# Patient Record
Sex: FEMALE | Race: OTHER | Employment: OTHER | URBAN - METROPOLITAN AREA
[De-identification: names, ages, dates, MRNs, and addresses within clinical notes are randomized per-mention and may not be internally consistent; named-entity substitution may affect disease eponyms.]

---

## 2022-06-02 ENCOUNTER — OFFICE VISIT (OUTPATIENT)
Dept: PRIMARY CARE CLINIC | Age: 27
End: 2022-06-02
Payer: COMMERCIAL

## 2022-06-02 VITALS
HEIGHT: 62 IN | OXYGEN SATURATION: 99 % | DIASTOLIC BLOOD PRESSURE: 64 MMHG | SYSTOLIC BLOOD PRESSURE: 100 MMHG | BODY MASS INDEX: 21.71 KG/M2 | WEIGHT: 118 LBS | HEART RATE: 89 BPM

## 2022-06-02 DIAGNOSIS — Z00.00 ANNUAL PHYSICAL EXAM: ICD-10-CM

## 2022-06-02 DIAGNOSIS — Z86.39 HX OF GRAVES' DISEASE: ICD-10-CM

## 2022-06-02 DIAGNOSIS — E03.2 HYPOTHYROIDISM DUE TO MEDICATION: ICD-10-CM

## 2022-06-02 DIAGNOSIS — Z00.00 ANNUAL PHYSICAL EXAM: Primary | ICD-10-CM

## 2022-06-02 DIAGNOSIS — L80 VITILIGO: ICD-10-CM

## 2022-06-02 LAB
A/G RATIO: 1.7 (ref 1.1–2.2)
ALBUMIN SERPL-MCNC: 4.7 G/DL (ref 3.4–5)
ALP BLD-CCNC: 61 U/L (ref 40–129)
ALT SERPL-CCNC: 11 U/L (ref 10–40)
ANION GAP SERPL CALCULATED.3IONS-SCNC: 14 MMOL/L (ref 3–16)
AST SERPL-CCNC: 16 U/L (ref 15–37)
BASOPHILS ABSOLUTE: 0 K/UL (ref 0–0.2)
BASOPHILS RELATIVE PERCENT: 0.4 %
BILIRUB SERPL-MCNC: 0.3 MG/DL (ref 0–1)
BUN BLDV-MCNC: 8 MG/DL (ref 7–20)
CALCIUM SERPL-MCNC: 9.8 MG/DL (ref 8.3–10.6)
CHLORIDE BLD-SCNC: 106 MMOL/L (ref 99–110)
CO2: 22 MMOL/L (ref 21–32)
CREAT SERPL-MCNC: 0.6 MG/DL (ref 0.6–1.1)
EOSINOPHILS ABSOLUTE: 0 K/UL (ref 0–0.6)
EOSINOPHILS RELATIVE PERCENT: 0.1 %
GFR AFRICAN AMERICAN: >60
GFR NON-AFRICAN AMERICAN: >60
GLUCOSE BLD-MCNC: 85 MG/DL (ref 70–99)
HCT VFR BLD CALC: 37.7 % (ref 36–48)
HEMOGLOBIN: 12.5 G/DL (ref 12–16)
HEPATITIS C ANTIBODY INTERPRETATION: NORMAL
LYMPHOCYTES ABSOLUTE: 1.4 K/UL (ref 1–5.1)
LYMPHOCYTES RELATIVE PERCENT: 26.6 %
MCH RBC QN AUTO: 32 PG (ref 26–34)
MCHC RBC AUTO-ENTMCNC: 33.2 G/DL (ref 31–36)
MCV RBC AUTO: 96.2 FL (ref 80–100)
MONOCYTES ABSOLUTE: 0.3 K/UL (ref 0–1.3)
MONOCYTES RELATIVE PERCENT: 5.2 %
NEUTROPHILS ABSOLUTE: 3.7 K/UL (ref 1.7–7.7)
NEUTROPHILS RELATIVE PERCENT: 67.7 %
PDW BLD-RTO: 14 % (ref 12.4–15.4)
PLATELET # BLD: 220 K/UL (ref 135–450)
PMV BLD AUTO: 10.8 FL (ref 5–10.5)
POTASSIUM SERPL-SCNC: 4.2 MMOL/L (ref 3.5–5.1)
RBC # BLD: 3.91 M/UL (ref 4–5.2)
SODIUM BLD-SCNC: 142 MMOL/L (ref 136–145)
T4 FREE: 1.1 NG/DL (ref 0.9–1.8)
TOTAL PROTEIN: 7.4 G/DL (ref 6.4–8.2)
TSH SERPL DL<=0.05 MIU/L-ACNC: 1.36 UIU/ML (ref 0.27–4.2)
WBC # BLD: 5.4 K/UL (ref 4–11)

## 2022-06-02 PROCEDURE — 99385 PREV VISIT NEW AGE 18-39: CPT | Performed by: STUDENT IN AN ORGANIZED HEALTH CARE EDUCATION/TRAINING PROGRAM

## 2022-06-02 PROCEDURE — 81002 URINALYSIS NONAUTO W/O SCOPE: CPT | Performed by: STUDENT IN AN ORGANIZED HEALTH CARE EDUCATION/TRAINING PROGRAM

## 2022-06-02 RX ORDER — METHIMAZOLE 10 MG/1
10 TABLET ORAL 3 TIMES DAILY
COMMUNITY

## 2022-06-02 SDOH — ECONOMIC STABILITY: FOOD INSECURITY: WITHIN THE PAST 12 MONTHS, THE FOOD YOU BOUGHT JUST DIDN'T LAST AND YOU DIDN'T HAVE MONEY TO GET MORE.: NEVER TRUE

## 2022-06-02 SDOH — ECONOMIC STABILITY: FOOD INSECURITY: WITHIN THE PAST 12 MONTHS, YOU WORRIED THAT YOUR FOOD WOULD RUN OUT BEFORE YOU GOT MONEY TO BUY MORE.: NEVER TRUE

## 2022-06-02 ASSESSMENT — PATIENT HEALTH QUESTIONNAIRE - PHQ9
SUM OF ALL RESPONSES TO PHQ QUESTIONS 1-9: 0
2. FEELING DOWN, DEPRESSED OR HOPELESS: 0
SUM OF ALL RESPONSES TO PHQ9 QUESTIONS 1 & 2: 0
SUM OF ALL RESPONSES TO PHQ QUESTIONS 1-9: 0
1. LITTLE INTEREST OR PLEASURE IN DOING THINGS: 0

## 2022-06-02 ASSESSMENT — SOCIAL DETERMINANTS OF HEALTH (SDOH): HOW HARD IS IT FOR YOU TO PAY FOR THE VERY BASICS LIKE FOOD, HOUSING, MEDICAL CARE, AND HEATING?: NOT HARD AT ALL

## 2022-06-02 NOTE — PROGRESS NOTES
2022    Ashley Magaña  (:  1995) is a 32 y.o. female, here for a preventive medicine evaluation. Patient Active Problem List   Diagnosis    Vitiligo    Hx of Graves' disease    Hypothyroidism due to medication     Moved from [facility] x 3 years ago and hasnt seen doctor since. Has been off methimazole for 2 months. Has been on medication for 3 years now. Felt really tired, dizzy, while he was on medicine and hair loss x couple months. No problems with constipation or diarrhea    Will get pain LUQ around lateral lower ribs that will wake up with pain, every other day, comes and goes, dull ache and notices after she walks for awhile or works out. Not associated with food. No dysuria or hematuria, no personal history of kidney stones but mother does have this. Review of Systems   All other systems reviewed and are negative. Prior to Visit Medications    Medication Sig Taking? Authorizing Provider   LEVOTHYROXINE SODIUM PO Take 7.5 mcg by mouth Yes Historical Provider, MD   methIMAzole (TAPAZOLE) 10 MG tablet Take 10 mg by mouth 3 times daily Yes Historical Provider, MD        No Known Allergies    Past Medical History:   Diagnosis Date    Hypothyroidism        History reviewed. No pertinent surgical history.     Social History     Socioeconomic History    Marital status:      Spouse name: Not on file    Number of children: Not on file    Years of education: Not on file    Highest education level: Not on file   Occupational History    Not on file   Tobacco Use    Smoking status: Never Smoker    Smokeless tobacco: Never Used   Vaping Use    Vaping Use: Never used   Substance and Sexual Activity    Alcohol use: Not Currently    Drug use: Never    Sexual activity: Not on file   Other Topics Concern    Not on file   Social History Narrative    Not on file     Social Determinants of Health     Financial Resource Strain: Low Risk     Difficulty of Paying Living Expenses: Not hard at all   Food Insecurity: No Food Insecurity    Worried About [facility] in the Last Year: Never true    Alpesh of Food in the Last Year: Never true   Transportation Needs:     Lack of Transportation (Medical): Not on file    Lack of Transportation (Non-Medical): Not on file   Physical Activity:     Days of Exercise per Week: Not on file    Minutes of Exercise per Session: Not on file   Stress:     Feeling of Stress : Not on file   Social Connections:     Frequency of Communication with Friends and Family: Not on file    Frequency of Social Gatherings with Friends and Family: Not on file    Attends Bahai Services: Not on file    Active Member of [address] [place] [address] or Organizations: Not on file    Attends Club or Organization Meetings: Not on file    Marital Status: Not on file   Intimate Partner Violence:     Fear of Current or Ex-Partner: Not on file    Emotionally Abused: Not on file    Physically Abused: Not on file    Sexually Abused: Not on file   Housing Stability:     Unable to Pay for Housing in the Last Year: Not on file    Number of [place] in the Last Year: Not on file    Unstable Housing in the Last Year: Not on file        History reviewed. No pertinent family history. ADVANCE DIRECTIVE: N, <no information>    Vitals:    06/02/22 1032   BP: 100/64   Site: Right Upper Arm   Position: Sitting   Cuff Size: Medium Adult   Pulse: 89   SpO2: 99%   Weight: 118 lb (53.5 kg)   Height: 5' 2\" (1.575 m)     Estimated body mass index is 21.58 kg/m² as calculated from the following:    Height as of this encounter: 5' 2\" (1.575 m). Weight as of this encounter: 118 lb (53.5 kg). Physical Exam  Vitals reviewed. Constitutional:       General: She is not in acute distress. Appearance: Normal appearance. She is not ill-appearing. HENT:      Head: Normocephalic and atraumatic.       Right Ear: Tympanic membrane, ear canal and external ear normal.      Left Ear: Tympanic membrane, ear canal and external ear normal.      Nose: Nose normal. No rhinorrhea. Mouth/Throat:      Mouth: Mucous membranes are moist.      Pharynx: Oropharynx is clear. No oropharyngeal exudate. Eyes:      General: No scleral icterus. Right eye: No discharge. Left eye: No discharge. Extraocular Movements: Extraocular movements intact. Conjunctiva/sclera: Conjunctivae normal.   Cardiovascular:      Rate and Rhythm: Normal rate and regular rhythm. Pulses: Normal pulses. Heart sounds: Normal heart sounds. No murmur heard. No gallop. Pulmonary:      Effort: Pulmonary effort is normal.      Breath sounds: Normal breath sounds. No wheezing, rhonchi or rales. Abdominal:      General: Abdomen is flat. Bowel sounds are normal. There is no distension. Palpations: Abdomen is soft. There is no mass. Musculoskeletal:         General: Normal range of motion. Cervical back: Neck supple. No muscular tenderness. Lymphadenopathy:      Cervical: No cervical adenopathy. Skin:     General: Skin is warm. Capillary Refill: Capillary refill takes less than 2 seconds. Findings: No rash. Neurological:      General: No focal deficit present. Mental Status: She is alert. Cranial Nerves: No cranial nerve deficit. Psychiatric:         Mood and Affect: Mood normal.         Behavior: Behavior normal.         No flowsheet data found. No results found for: CHOL, CHOLFAST, TRIG, TRIGLYCFAST, HDL, LDLCHOLESTEROL, LDLCALC, GLUF, GLUCOSE, LABA1C    The ASCVD Risk score (Odfederico Baxter., et al., 2013) failed to calculate for the following reasons: The 2013 ASCVD risk score is only valid for ages 36 to 78      There is no immunization history on file for this patient.     Health Maintenance   Topic Date Due    Varicella vaccine (1 of 2 - 2-dose childhood series) Never done    COVID-19 Vaccine (1) Never done    Depression Screen  Never done    HIV screen  Never done    Hepatitis C screen  Never done    DTaP/Tdap/Td vaccine (1 - Tdap) Never done    Pap smear  Never done    Flu vaccine (Season Ended) 09/01/2022    Hepatitis A vaccine  Aged Out    Hepatitis B vaccine  Aged Out    Hib vaccine  Aged Out    Meningococcal (ACWY) vaccine  Aged Out    Pneumococcal 0-64 years Vaccine  Aged Out       Assessment & Plan   Annual physical exam  -     CBC with Auto Differential; Future  -     Comprehensive Metabolic Panel; Future  -     HIV Screen; Future  -     Hepatitis C Antibody; Future  -     POCT Urinalysis no Micro  Vitiligo  Hx of Graves' disease  -     T4, Free; Future  -     TSH; Future  -     THYROID STIMULATING IMMUNOGLOBULIN; Future  -     THYROTROPIN RECEPTOR ANTIBODY; Future  Hypothyroidism due to medication  Will await labs before sending in new medication. General wellness exam. Reviewed chart for past hx and updated today. Counseled on age appropriate health guidance and discussed screening recommendations. Vaccinations reviewed and discussed. All questions answered    Return in about 1 year (around 6/2/2023), or if symptoms worsen or fail to improve.          --Izaiah Amezcua MD

## 2022-06-03 LAB
HIV AG/AB: NORMAL
HIV ANTIGEN: NORMAL
HIV-1 ANTIBODY: NORMAL
HIV-2 AB: NORMAL

## 2022-06-04 LAB — TSH RECEPTOR AB: <0.8 IU/L

## 2022-06-07 LAB — THYROID STIMULATING IMMUNOGLOBULIN: <0.1 IU/L

## 2022-06-13 DIAGNOSIS — E05.00 GRAVES DISEASE: Primary | ICD-10-CM

## 2022-09-15 ENCOUNTER — OFFICE VISIT (OUTPATIENT)
Dept: ENDOCRINOLOGY | Age: 27
End: 2022-09-15
Payer: COMMERCIAL

## 2022-09-15 VITALS
HEIGHT: 62 IN | RESPIRATION RATE: 14 BRPM | HEART RATE: 75 BPM | TEMPERATURE: 98 F | WEIGHT: 119 LBS | BODY MASS INDEX: 21.9 KG/M2 | SYSTOLIC BLOOD PRESSURE: 110 MMHG | OXYGEN SATURATION: 99 % | DIASTOLIC BLOOD PRESSURE: 68 MMHG

## 2022-09-15 DIAGNOSIS — H57.89 THYROID EYE DISEASE: ICD-10-CM

## 2022-09-15 DIAGNOSIS — E05.00 GRAVES' DISEASE: ICD-10-CM

## 2022-09-15 DIAGNOSIS — E07.9 THYROID EYE DISEASE: ICD-10-CM

## 2022-09-15 DIAGNOSIS — E04.9 THYROID ENLARGEMENT: ICD-10-CM

## 2022-09-15 DIAGNOSIS — L80 VITILIGO: ICD-10-CM

## 2022-09-15 DIAGNOSIS — E05.00 GRAVES' DISEASE: Primary | ICD-10-CM

## 2022-09-15 LAB
ANTI-THYROGLOB ABS: 18 IU/ML
T3 FREE: 2.9 PG/ML (ref 2.3–4.2)
T3 TOTAL: 1.08 NG/ML (ref 0.8–2)
T4 TOTAL: 7.4 UG/DL (ref 4.5–10.9)
THYROID PEROXIDASE (TPO) ABS: 149 IU/ML

## 2022-09-15 PROCEDURE — 99204 OFFICE O/P NEW MOD 45 MIN: CPT | Performed by: INTERNAL MEDICINE

## 2022-09-15 RX ORDER — M-VIT,TX,IRON,MINS/CALC/FOLIC 27MG-0.4MG
1 TABLET ORAL DAILY
COMMUNITY

## 2022-09-15 NOTE — PROGRESS NOTES
SUBJECTIVE:  Tuan David  is a 32 y.o. female who is being evaluated for Graves' disease. 1. Graves' disease  This started in 2017. Patient was diagnosed with Graves' disese. The problem has been unchanged. Previous thyroid studies include: TSH, triiodothyronine free, and free thyroxine. Patient started medication in N/A. Currently patient is on: N/A. Misses N/A doses a month. Current complaints: denies fatigue, weight changes, heat/cold intolerance, bowel/skin changes or CVS symptoms  At the time of diagnosis patient had sweating, tachycardia, hand tremor, lost weight, insomnia, feeling hot. [place] a [place], and in the beginning she had only emergency insurance. .  All meds from [facility]  Now has insurance  Patient was treated with Tapazole 10 mg 3 tablets daily. Had palpitations, fast heart beat 5 years ago. Patient was found to have larger left eye at that time. In the beginning she was treated with Tapazole 10 mg daily. 3 years ago increased to 3 tablets daily. At the same time she was started on levothyroxine 0.075 mg daily for 3 years  Felt dizzy, out of balance. Stopped taking both meds 4 months ago. Dr. Yovani Ga referred here. Patient feels okay overall    2. Thyroid eye disease  Has eye sensitivity  Did not get worse. In [facility] had Ophthalmolgy testing. Left eye more prominent. 3.  Thyroid enlargement  History of obstructive symptoms: difficulty swallowing No, changes in voice/hoarseness No.  History of radiation to patient's neck: No  Resent iodine exposure: No  Family history includes no thyroid abnormalities. Family history of thyroid cancer: No  Vitiligo since 1yo  On all body    4. Vitiligo  All over the body.   She has it since age 1      Past Medical History:   Diagnosis Date    Hypothyroidism      Patient Active Problem List    Diagnosis Date Noted    Thyroid eye disease 09/16/2022    Graves' disease 09/15/2022    Thyroid enlargement 09/15/2022    Vitiligo 06/02/2022    Hx of Graves' disease 06/02/2022    Hypothyroidism due to medication 06/02/2022     History reviewed. No pertinent surgical history. Family History   Problem Relation Age of Onset    Kidney Disease Mother     No Known Problems Father     No Known Problems Sister     No Known Problems Sister      Social History     Socioeconomic History    Marital status:      Spouse name: None    Number of children: None    Years of education: None    Highest education level: None   Tobacco Use    Smoking status: Never    Smokeless tobacco: Never   Vaping Use    Vaping Use: Never used   Substance and Sexual Activity    Alcohol use: Not Currently    Drug use: Never     Social Determinants of Health     Financial Resource Strain: Low Risk     Difficulty of Paying Living Expenses: Not hard at all   Food Insecurity: No Food Insecurity    Worried About Running Out of Food in the Last Year: Never true    Ran Out of Food in the Last Year: Never true     Current Outpatient Medications   Medication Sig Dispense Refill    UNABLE TO FIND Take 1 capsule by mouth daily Nutrilite- PMS Women's Health (ease premenstrual sx) - contains 60 mcg of Vitamin A, contains 125 mg of evening primrose oil, 125 mg of borage oil, 50mg of tita extract, 20 mg of dong quai extract      Multiple Vitamins-Minerals (THERAPEUTIC MULTIVITAMIN-MINERALS) tablet Take 1 tablet by mouth daily      LEVOTHYROXINE SODIUM PO Take 7.5 mcg by mouth (Patient not taking: Reported on 9/15/2022)      methIMAzole (TAPAZOLE) 10 MG tablet Take 10 mg by mouth 3 times daily (Patient not taking: Reported on 9/15/2022)       No current facility-administered medications for this visit.      No Known Allergies  Family Status   Relation Name Status    Mother  Alive    Father  Alive    Sister  Alive    Sister  Alive       Review of Systems:  Constitutional: no fatigue, no fever, no recent weight gain, no recent weight loss, no changes in appetite  Eyes: no eye pain, no change in vision, no eye redness, no eye irritation, no double vision  Ears, nose, throat: no nasal congestion, no sore throat, no earache, no decrease in hearing, no hoarseness, no dry mouth, no sinus problems, no difficulty swallowing, no neck lumps, no dental problems, no mouth sores, no ringing in ears  Pulmonary: no shortness of breath, no wheezing, no dyspnea on exertion, no cough  Cardiovascular: no chest pain, no lower extremity edema, no orthopnea, no intermittent leg claudication, no palpitations  Gastrointestinal: no abdominal pain, no nausea, no vomiting, no diarrhea, no constipation, no dysphagia, no heartburn, no bloating  Genitourinary: no dysuria, no urinary incontinence, no urinary hesitancy, no urinary frequency, no feelings of urinary urgency, no nocturia  Musculoskeletal: no joint swelling, no joint stiffness, no joint pain, no muscle cramps, no muscle pain, no bone pain  Integument/Breast: no hair loss, no skin rashes, no skin lesions, no itching, no dry skin, has skin changes due to vitiligo  Neurological: no numbness, no tingling, no weakness, no confusion, has headaches, no dizziness, no fainting, no tremors, no decrease in memory, no balance problems  Psychiatric: no anxiety, no depression, no insomnia  Hematologic/Lymphatic: no tendency for easy bleeding, no swollen lymph nodes, no tendency for easy bruising  Immunology: has seasonal allergies, no frequent infections, no frequent illnesses  Endocrine: no temperature intolerance    /68   Pulse 75   Temp 98 °F (36.7 °C)   Resp 14   Ht 5' 2\" (1.575 m)   Wt 119 lb (54 kg)   SpO2 99%   BMI 21.77 kg/m²    Wt Readings from Last 3 Encounters:   09/15/22 119 lb (54 kg)   06/02/22 118 lb (53.5 kg)     Body mass index is 21.77 kg/m².     OBJECTIVE:  Constitutional: no acute distress, well appearing and well nourished  Psychiatric: oriented to person, place and time, judgement and insight and normal, recent and remote memory and intact and mood and affect are normal  Skin: skin and subcutaneous tissue is normal without mass, normal turgor, has vitiligo rash on her body  Head and Face: examination of head and face revealed no abnormalities  Eyes: no lid or conjunctival swelling, erythema or discharge, pupils are normal, equal, round, reactive to light, no lid lag, stare or proptosis  Ears/Nose: external inspection of ears and nose revealed no abnormalities, hearing is grossly normal  Oropharynx/Mouth/Face: lips, tongue and gums are normal with no lesions, the voice quality was normal  Neck: neck is supple and symmetric, with midline trachea and no masses, thyroid is normal  Lymphatics: normal cervical lymph nodes, normal supraclavicular nodes  Pulmonary: no increased work of breathing or signs of respiratory distress, lungs are clear to auscultation  Cardiovascular: normal heart rate and rhythm, normal S1 and S2, no murmurs and pedal pulses and 2+ bilaterally, No edema  Abdomen: abdomen is soft, non-tender with no masses  Musculoskeletal: normal gait and station and exam of the digits and nails are normal  Neurological: normal coordination and normal general cortical function, no hand tremor      Lab Review:    Lab Results   Component Value Date/Time    WBC 5.4 06/02/2022 11:37 AM    HGB 12.5 06/02/2022 11:37 AM    HCT 37.7 06/02/2022 11:37 AM    MCV 96.2 06/02/2022 11:37 AM     06/02/2022 11:37 AM     Lab Results   Component Value Date/Time     06/02/2022 11:37 AM    K 4.2 06/02/2022 11:37 AM     06/02/2022 11:37 AM    CO2 22 06/02/2022 11:37 AM    BUN 8 06/02/2022 11:37 AM    CREATININE 0.6 06/02/2022 11:37 AM    GLUCOSE 85 06/02/2022 11:37 AM    CALCIUM 9.8 06/02/2022 11:37 AM    PROT 7.4 06/02/2022 11:37 AM    LABALBU 4.7 06/02/2022 11:37 AM    BILITOT 0.3 06/02/2022 11:37 AM    ALKPHOS 61 06/02/2022 11:37 AM    AST 16 06/02/2022 11:37 AM    ALT 11 06/02/2022 11:37 AM    LABGLOM >60 06/02/2022 11:37 AM    GFRAA >60 06/02/2022 11:37 AM    AGRATIO 1.7 06/02/2022 11:37 AM     Lab Results   Component Value Date/Time    TSH 1.36 06/02/2022 11:37 AM    FT3 2.9 09/15/2022 10:32 AM     No results found for: LABA1C  No results found for: EAG  No results found for: CHOL  No results found for: TRIG  No results found for: HDL  No results found for: LDLCHOLESTEROL, LDLCALC  No results found for: LABVLDL, VLDL  No results found for: CHOLHDLRATIO  No results found for: LABMICR, [id number]  No results found for: VITD25     ASSESSMENT/PLAN:  1. Graves' disease  Obtain lab work, then reevaluate up-to-date done work-up for hyperthyroidism showed normal thyroid function. TSI and TR antibodies negative  Call for results  If stable, follow-up in 1 year. If symptomatic, follow-up anytime as needed  Counseled patient about Graves' disease diagnosis, pathophysiology, follow-up, monitoring, relapse, importance of testing  Counseled patient that combination levothyroxine and methimazole regimen is not used at this time as it was used in the past.  Counseled patient about Tepezza treatment. - US HEAD NECK SOFT TISSUE THYROID; Future  - T3; Future  - T4; Future  - T3, Free; Future  -Free T4  - TSH  - Thyroid Peroxidase Antibody; Future  - Anti-Thyroglobulin Antibody; Future  -TSI  - TR antibody    2. Thyroid enlargement  Obtain thyroid sonogram  - US HEAD NECK SOFT TISSUE THYROID; Future  - T3; Future  - T4; Future  - T3, Free; Future  - Thyroid Peroxidase Antibody; Future  - Anti-Thyroglobulin Antibody; Future    3. Vitiligo  Discussed vitiligo association with autoimmune thyroid disease. Continue monitoring    4. Thyroid eye disease  On exam left eye larger, but no proptosis, stare or lid lag. Continue close monitoring. If indicated, refer to Dr. Nazario Dawnt patient about Marco Mckenzie. TR antibody and TSI negative at this time.       Reviewed and/or ordered clinical lab results Yes  Reviewed and/or ordered radiology tests Yes   Reviewed and/or ordered other diagnostic tests No  Discussed test results with performing physician No  Independently reviewed image, tracing, or specimen No  Made a decision to obtain old records No  Reviewed and summarized old records Yes  ALT 11  AST 16  White blood cell count 5.4  TSI less than 0.1  TSH 1.36  Free T4 1.1  ER antibody less than 0.8 obtained history from other than patient Joslyn Alicea Alecia Celaya  was counseled regarding symptoms of thyroid disease diagnosis, course and complications of disease if inadequately treated, side effects of medications, diagnosis, treatment options, and prognosis, risks, benefits, complications, and alternatives of treatment, labs, imaging and other studies and treatment targets and goals. She understands instructions and counseling. Total time I spent for this encounter gathering history, performing physical exam, coordinating care, counseling, and documenting in the chart - 45 minutes    Return in about 1 year (around 9/15/2023) for thyroid problems.     Electronically signed by Tariq Marques MD on 9/16/2022 at 12:53 AM

## 2022-09-16 PROBLEM — H57.89 THYROID EYE DISEASE: Status: ACTIVE | Noted: 2022-09-16

## 2022-09-16 PROBLEM — E07.9 THYROID EYE DISEASE: Status: ACTIVE | Noted: 2022-09-16

## 2022-09-17 ENCOUNTER — TELEPHONE (OUTPATIENT)
Dept: ENDOCRINOLOGY | Age: 27
End: 2022-09-17

## 2022-09-22 ENCOUNTER — HOSPITAL ENCOUNTER (OUTPATIENT)
Dept: ULTRASOUND IMAGING | Age: 27
Discharge: HOME | End: 2022-09-22
Payer: COMMERCIAL

## 2022-09-22 DIAGNOSIS — E05.00 GRAVES' DISEASE: ICD-10-CM

## 2022-09-22 DIAGNOSIS — E04.9 THYROID ENLARGEMENT: ICD-10-CM

## 2022-09-22 PROCEDURE — 76536 US EXAM OF HEAD AND NECK: CPT

## 2022-09-25 ENCOUNTER — TELEPHONE (OUTPATIENT)
Dept: ENDOCRINOLOGY | Age: 27
End: 2022-09-25

## 2022-09-25 DIAGNOSIS — E04.1 THYROID NODULE: Primary | ICD-10-CM

## 2022-09-25 NOTE — TELEPHONE ENCOUNTER
Please inform patient:  Thyroid is enlarged, especially on the right side. Right isthmus nodule 1.3 cm with calcifications. This nodule needs to be biopsied. Please ask patient to schedule biopsy at [facility]  I placed order.

## 2022-10-04 ENCOUNTER — HOSPITAL ENCOUNTER (OUTPATIENT)
Dept: ULTRASOUND IMAGING | Age: 27
Discharge: HOME | End: 2022-10-04
Payer: COMMERCIAL

## 2022-10-04 DIAGNOSIS — E04.1 THYROID NODULE: ICD-10-CM

## 2022-10-04 PROCEDURE — 88305 TISSUE EXAM BY PATHOLOGIST: CPT

## 2022-10-04 PROCEDURE — 10005 FNA BX W/US GDN 1ST LES: CPT

## 2022-10-04 PROCEDURE — 88172 CYTP DX EVAL FNA 1ST EA SITE: CPT

## 2022-10-04 PROCEDURE — 88173 CYTOPATH EVAL FNA REPORT: CPT

## 2022-10-11 ENCOUNTER — TELEPHONE (OUTPATIENT)
Dept: ENDOCRINOLOGY | Age: 27
End: 2022-10-11

## 2022-10-13 NOTE — TELEPHONE ENCOUNTER
FINAL DIAGNOSIS:     Thyroid, Isthmus Fine Needle Aspiration:   SUSPICIOUS     Cannot rule out a follicular neoplasm. I spoke with patient. Advised that I will contact pathology to determine sending specimen for Afirma. I will get back with her.
Have You Had Microneedling Treatments Before?: has had a previous microneedling treatment
I called pathology department and ask pathologist to call me back.
I called pathology department and confirmed that patient's biopsy material will be sent for Afirma testing.
I spoke with pathologist.  Cytology is considered indeterminate. Material will be sent to Afirma testing. Notified patient. Please call pathology to confirm that material was sent for Afirma testing. Also, please cancel her appointment on 13th and reschedule next available.
Noted pt aware. Appt cancelled.
Please schedule patient on Thursday. Suspicious biopsy.
Pt scheduled.
When Was Your Last Treatment?: 1/15/19

## 2022-11-15 ENCOUNTER — OFFICE VISIT (OUTPATIENT)
Dept: ENDOCRINOLOGY | Age: 27
End: 2022-11-15
Payer: COMMERCIAL

## 2022-11-15 ENCOUNTER — TELEPHONE (OUTPATIENT)
Dept: ENDOCRINOLOGY | Age: 27
End: 2022-11-15

## 2022-11-15 VITALS
SYSTOLIC BLOOD PRESSURE: 130 MMHG | TEMPERATURE: 98 F | WEIGHT: 123 LBS | DIASTOLIC BLOOD PRESSURE: 70 MMHG | OXYGEN SATURATION: 98 % | HEART RATE: 88 BPM | RESPIRATION RATE: 14 BRPM | HEIGHT: 62 IN | BODY MASS INDEX: 22.63 KG/M2

## 2022-11-15 DIAGNOSIS — E05.00 GRAVES' DISEASE: Primary | ICD-10-CM

## 2022-11-15 DIAGNOSIS — L80 VITILIGO: ICD-10-CM

## 2022-11-15 DIAGNOSIS — E04.9 GOITER: ICD-10-CM

## 2022-11-15 DIAGNOSIS — E07.9 THYROID EYE DISEASE: ICD-10-CM

## 2022-11-15 DIAGNOSIS — E04.1 THYROID NODULE: ICD-10-CM

## 2022-11-15 DIAGNOSIS — H57.89 THYROID EYE DISEASE: ICD-10-CM

## 2022-11-15 PROCEDURE — 99215 OFFICE O/P EST HI 40 MIN: CPT | Performed by: INTERNAL MEDICINE

## 2022-11-15 NOTE — PROGRESS NOTES
SUBJECTIVE:  Rome Rodriguez  is a 32 y.o. female who is being evaluated for thyroid nodule, abnormal biopsy       Thyroid nodule/goiter  History of obstructive symptoms: difficulty swallowing No, changes in voice/hoarseness No.  History of radiation to patient's neck: No  Resent iodine exposure: No  Family history includes no thyroid abnormalities. Family history of thyroid cancer: No  Vitiligo since 1yo  On all body  Right isthmus nodule 1.3 cm FNA abnormal, patient is here to discuss results and further plan. 2. Graves' disease  This started in 2017. Patient was diagnosed with Graves' disese. The problem has been unchanged. Previous thyroid studies include: TSH, triiodothyronine free, and free thyroxine. Patient started medication in N/A. Currently patient is on: N/A. Misses N/A doses a month. Current complaints: denies fatigue, weight changes, heat/cold intolerance, bowel/skin changes or CVS symptoms  At the time of diagnosis patient had sweating, tachycardia, hand tremor, lost weight, insomnia, feeling hot. [place] a [place], and in the beginning she had only emergency insurance. .  All meds from [facility]  Now has insurance  Patient was treated with Tapazole 10 mg 3 tablets daily. Had palpitations, fast heart beat 5 years ago. Patient was found to have larger left eye at that time. In the beginning she was treated with Tapazole 10 mg daily. 3 years ago increased to 3 tablets daily. At the same time she was started on levothyroxine 0.075 mg daily for 3 years  Felt dizzy, out of balance. Stopped taking both meds 5 months ago. Patient feels okay overall    3. Thyroid eye disease  Has eye sensitivity  Did not get worse. In [facility] had Ophthalmolgy testing. Left eye more prominent. Improved per patient. 4. Vitiligo  All over the body.   She has it since age 1    EXAM: [facility] SOFT TISSUE THYROID       INDICATION: Graves' disease, Thyroid enlargement       COMPARISON: None TECHNIQUE: Multiplanar grayscale analysis of the thyroid was performed. FINDINGS:       The right lobe measures 7.4 x 1.9 x 2.6 cm. The left lobe measures 5.5 x 1.4 x 1.9 cm. The isthmus measures 4 mm in anteroposterior dimension. The thyroid gland appears moderately enlarged, asymmetric in the right thyroid lobe. 1.3 x 0.8 x 1.0 cm heterogeneous nodule within the right isthmus with calcifications. Impression       Moderate thyromegaly with 1.3 cm nodule in the a right isthmus, recommend FNA for further evaluation. Past Medical History:   Diagnosis Date    Hypothyroidism      Patient Active Problem List    Diagnosis Date Noted    Thyroid nodule 09/25/2022    Thyroid eye disease 09/16/2022    Graves' disease 09/15/2022    Goiter 09/15/2022    Vitiligo 06/02/2022    Hx of Graves' disease 06/02/2022    Hypothyroidism due to medication 06/02/2022     History reviewed. No pertinent surgical history.   Family History   Problem Relation Age of Onset    Kidney Disease Mother     No Known Problems Father     No Known Problems Sister     No Known Problems Sister      Social History     Socioeconomic History    Marital status:      Spouse name: None    Number of children: None    Years of education: None    Highest education level: None   Tobacco Use    Smoking status: Never    Smokeless tobacco: Never   Vaping Use    Vaping Use: Never used   Substance and Sexual Activity    Alcohol use: Not Currently    Drug use: Never     Social Determinants of Health     Financial Resource Strain: Low Risk     Difficulty of Paying Living Expenses: Not hard at all   Food Insecurity: No Food Insecurity    Worried About Running Out of Food in the Last Year: Never true    Ran Out of Food in the Last Year: Never true     Current Outpatient Medications   Medication Sig Dispense Refill    UNABLE TO FIND Take 1 capsule by mouth daily Nutrilite- PMS Women's Health (ease premenstrual sx) - contains 60 mcg of Vitamin A, contains 125 mg of evening primrose oil, 125 mg of borage oil, 50mg of tita extract, 20 mg of dong quai extract      Multiple Vitamins-Minerals (THERAPEUTIC MULTIVITAMIN-MINERALS) tablet Take 1 tablet by mouth daily      LEVOTHYROXINE SODIUM PO Take 7.5 mcg by mouth (Patient not taking: No sig reported)      methIMAzole (TAPAZOLE) 10 MG tablet Take 10 mg by mouth 3 times daily (Patient not taking: No sig reported)       No current facility-administered medications for this visit.      No Known Allergies  Family Status   Relation Name Status    Mother  Alive    Father  Alive    Sister  Alive    Sister  Alive       Review of Systems:  Constitutional: no fatigue, no fever, no recent weight gain, no recent weight loss, no changes in appetite  Eyes: no eye pain, no change in vision, no eye redness, no eye irritation, no double vision  Ears, nose, throat: no nasal congestion, no sore throat, no earache, no decrease in hearing, no hoarseness, no dry mouth, no sinus problems, no difficulty swallowing, no neck lumps, no dental problems, no mouth sores, no ringing in ears  Pulmonary: no shortness of breath, no wheezing, no dyspnea on exertion, no cough  Cardiovascular: no chest pain, no lower extremity edema, no orthopnea, no intermittent leg claudication, no palpitations  Gastrointestinal: no abdominal pain, no nausea, no vomiting, no diarrhea, no constipation, no dysphagia, no heartburn, no bloating  Genitourinary: no dysuria, no urinary incontinence, no urinary hesitancy, no urinary frequency, no feelings of urinary urgency, no nocturia  Musculoskeletal: no joint swelling, no joint stiffness, no joint pain, no muscle cramps, no muscle pain, no bone pain  Integument/Breast: no hair loss, no skin rashes, no skin lesions, no itching, no dry skin, has skin changes due to vitiligo  Neurological: no numbness, no tingling, no weakness, no confusion, has headaches, no dizziness, no fainting, no tremors, no decrease in memory, no balance problems  Psychiatric: no anxiety, no depression, no insomnia  Hematologic/Lymphatic: no tendency for easy bleeding, no swollen lymph nodes, no tendency for easy bruising  Immunology: has seasonal allergies, no frequent infections, no frequent illnesses  Endocrine: no temperature intolerance    /70   Pulse 88   Temp 98 °F (36.7 °C)   Resp 14   Ht 5' 2\" (1.575 m)   Wt 123 lb (55.8 kg)   SpO2 98%   BMI 22.50 kg/m²    Wt Readings from Last 3 Encounters:   11/15/22 123 lb (55.8 kg)   09/15/22 119 lb (54 kg)   06/02/22 118 lb (53.5 kg)     Body mass index is 22.5 kg/m².     OBJECTIVE:  Constitutional: no acute distress, well appearing and well nourished  Psychiatric: oriented to person, place and time, judgement and insight and normal, recent and remote memory and intact and mood and affect are normal  Skin: skin and subcutaneous tissue is normal without mass, normal turgor, has vitiligo rash on her body  Head and Face: examination of head and face revealed no abnormalities  Eyes: no lid or conjunctival swelling, erythema or discharge, pupils are normal, equal, round, reactive to light, no lid lag, stare or proptosis  Ears/Nose: external inspection of ears and nose revealed no abnormalities, hearing is grossly normal  Oropharynx/Mouth/Face: lips, tongue and gums are normal with no lesions, the voice quality was normal  Neck: neck is supple and symmetric, with midline trachea and no masses, thyroid is enlarged  Lymphatics: normal cervical lymph nodes, normal supraclavicular nodes  Pulmonary: no increased work of breathing or signs of respiratory distress, lungs are clear to auscultation  Cardiovascular: normal heart rate and rhythm, normal S1 and S2, no murmurs and pedal pulses and 2+ bilaterally, No edema  Abdomen: abdomen is soft, non-tender with no masses  Musculoskeletal: normal gait and station and exam of the digits and nails are normal  Neurological: normal coordination and normal general cortical function, no hand tremor      Lab Review:    Lab Results   Component Value Date/Time    WBC 5.4 06/02/2022 11:37 AM    HGB 12.5 06/02/2022 11:37 AM    HCT 37.7 06/02/2022 11:37 AM    MCV 96.2 06/02/2022 11:37 AM     06/02/2022 11:37 AM     Lab Results   Component Value Date/Time     06/02/2022 11:37 AM    K 4.2 06/02/2022 11:37 AM     06/02/2022 11:37 AM    CO2 22 06/02/2022 11:37 AM    BUN 8 06/02/2022 11:37 AM    CREATININE 0.6 06/02/2022 11:37 AM    GLUCOSE 85 06/02/2022 11:37 AM    CALCIUM 9.8 06/02/2022 11:37 AM    PROT 7.4 06/02/2022 11:37 AM    LABALBU 4.7 06/02/2022 11:37 AM    BILITOT 0.3 06/02/2022 11:37 AM    ALKPHOS 61 06/02/2022 11:37 AM    AST 16 06/02/2022 11:37 AM    ALT 11 06/02/2022 11:37 AM    LABGLOM >60 06/02/2022 11:37 AM    GFRAA >60 06/02/2022 11:37 AM    AGRATIO 1.7 06/02/2022 11:37 AM     Lab Results   Component Value Date/Time    TSH 1.36 06/02/2022 11:37 AM    FT3 2.9 09/15/2022 10:32 AM     No results found for: LABA1C  No results found for: EAG  No results found for: CHOL  No results found for: TRIG  No results found for: HDL  No results found for: LDLCHOLESTEROL, LDLCALC  No results found for: LABVLDL, VLDL  No results found for: CHOLHDLRATIO  No results found for: LABMICR, [id number]  No results found for: VITD25     ASSESSMENT/PLAN:    1. Goiter/Thyroid nodule  Right isthmus nodule 1.3 cm FNA: Suspicious  BRAF positive, risk of malignancy >95%  Counseled patient extensively about thyroid cancer risk, BRAF positive nodule risk of malignancy >95%, indications for surgery, risks of surgery, other medical conditions with association to BRAF positive patients. Patient recently moved to [facility], she will seek care at [facility]. Referred patient for surgery. Also referred to establish care with endocrinologist in [facility]. ADDENDUM:  . ... Bianca Gus[facility] [employer]  evaluation (   [address] [facility], [place], [place]) is reported as: \"Suspicious   BRAF: [id number]   Risk of Malignancy: >95%\"   Please see [facility] report for complete detailed report and   comments  - T3; Future  - T4; Future  - T3, Free; Future    2. Graves' disease  TSH 1.36  Thyroid tests showed normal thyroid function. TSI and TR antibodies negative    - T3; Future  - T4; Future  - T3, Free; Future  -Free T4  - TSH  - Thyroid Peroxidase Antibody; Future  - Anti-Thyroglobulin Antibody; Future  -TSI  - TR antibody    3. Vitiligo  Discussed vitiligo association with autoimmune thyroid disease. Continue monitoring    4. Thyroid eye disease  No proptosis, stare or lid lag. Continue close monitoring. If indicated, refer to Dr. Bouchra Fields patient about Sadaf Cueva. TR antibody and TSI negative at this time. Reviewed and/or ordered clinical lab results Yes  Reviewed and/or ordered radiology tests Yes   Reviewed and/or ordered other diagnostic tests No  Discussed test results with performing physician No  Independently reviewed image, tracing, or specimen No  Made a decision to obtain old records No  Reviewed and summarized old records Yes  ALT 11  AST 16  White blood cell count 5.4  TSI less than 0.1  TSH 1.36  Free T4 1.1  ER antibody less than 0.8 obtained history from other than patient No    [facility]  was counseled regarding symptoms of thyroid disease diagnosis, course and complications of disease if inadequately treated, side effects of medications, diagnosis, treatment options, and prognosis, risks, benefits, complications, and alternatives of treatment, labs, imaging and other studies and treatment targets and goals, risk of cancer and BRAF positive thyroid nodules, surgical referral, associated diseases with BRAF positive cancers. .  She understands instructions and counseling.     Total time I spent for this encounter gathering history, performing physical exam, coordinating care, counseling, and documenting in the chart - 40 minutes    No follow-ups on file. Patient will establish care in [facility], she recently moved to the [facility].     Electronically signed by Kelli Sterling MD on 11/15/2022 at 10:58 PM

## 2022-11-16 NOTE — TELEPHONE ENCOUNTER
I reprinted endocrinology referral and added ENT surgery referral.  Please notify patient that she should schedule appointment with both, endocrinologist to establish care in [facility] and the ENT surgeon for immediate consultation as soon as possible. If she provides us with the numbers, please fax the referrals.   Ask if she wants us to mail ENT surgery referral.  She already has an endocrinologist referral.